# Patient Record
(demographics unavailable — no encounter records)

---

## 2024-12-13 NOTE — ASSESSMENT
[FreeTextEntry1] : [facility] -Will refer to surgery  Constipation/pruritus ani - Start Metamucil daily; Keep hydrated Can try sitz bath's Topical steroid cream for the next 2 weeks  History of polyps Schedule surveillance colonoscopy  GERD - And evidence of reflux seen on ENT exam Can add EGD at time of colonoscopy to evaluate upper GI tract anatomy Discussed risks of the procedure -  including but not limited to bleeding, infection, drug reaction, perforation and missed lesion.  Also discussed benefits and alternatives of this procedure with patient - including no treatment  - and the patient consents to undergoing the procedure.

## 2024-12-13 NOTE — HISTORY OF PRESENT ILLNESS
[FreeTextEntry1] : Had AF ablation in July - [facility] (Had SVT ablation 2020) Off Eliquis now  Has a history of colon polyps;  last colonoscopy 5 years ago Presents for surveillance colonoscopy After last colonoscopy felt loopy - went to ER day or two later - CT normal   Has had anal itching, sees BRB on TP sometimes Doesn't have constipation frequently, but when he strains he has seen blood  Has felt a possible left hernia  - happens when coughs Has been painful at times; he walks a lot at work and the [place] and has felt it more while exerting himself  Eats spicy food and gets burning in chest Saw ENT and saw marks of GERD - omeprazole made him feel off

## 2024-12-13 NOTE — REASON FOR VISIT
[Follow-up] : a follow-up of an existing diagnosis [FreeTextEntry1] : History of colon polyps, GERD, left inguinal bulge

## 2024-12-13 NOTE — PHYSICAL EXAM
[Normal] : normal bowel sounds, non-tender, no masses, soft, no no hepato-splenomegaly [de-identified] : Left inguinal hernia bulges with Valsalva maneuver